# Patient Record
Sex: MALE | Race: BLACK OR AFRICAN AMERICAN | NOT HISPANIC OR LATINO | URBAN - METROPOLITAN AREA
[De-identification: names, ages, dates, MRNs, and addresses within clinical notes are randomized per-mention and may not be internally consistent; named-entity substitution may affect disease eponyms.]

---

## 2017-05-18 ENCOUNTER — FOLLOW UP ESTABLISHED (OUTPATIENT)
Dept: URBAN - METROPOLITAN AREA CLINIC 24 | Facility: CLINIC | Age: 62
End: 2017-05-18
Payer: COMMERCIAL

## 2017-05-18 DIAGNOSIS — E11.9 TYPE 2 DIABETES MELLITUS WITHOUT COMPLICATIONS: ICD-10-CM

## 2017-05-18 PROCEDURE — 92083 EXTENDED VISUAL FIELD XM: CPT | Performed by: OPTOMETRIST

## 2017-05-18 PROCEDURE — 92012 INTRM OPH EXAM EST PATIENT: CPT | Performed by: OPTOMETRIST

## 2017-05-18 ASSESSMENT — INTRAOCULAR PRESSURE
OS: 6
OD: 6

## 2017-06-19 ENCOUNTER — RX CHECK (OUTPATIENT)
Dept: URBAN - METROPOLITAN AREA CLINIC 30 | Facility: CLINIC | Age: 62
End: 2017-06-19
Payer: COMMERCIAL

## 2017-06-19 DIAGNOSIS — H02.431 PARALYTIC PTOSIS OF RIGHT EYELID: ICD-10-CM

## 2017-06-19 DIAGNOSIS — Z86.73 PRSNL HX OF TIA (TIA), AND CEREB INFRC W/O RESID DEFICITS: Primary | ICD-10-CM

## 2017-06-19 PROCEDURE — 92004 COMPRE OPH EXAM NEW PT 1/>: CPT | Performed by: OPHTHALMOLOGY

## 2017-06-19 PROCEDURE — 92134 CPTRZ OPH DX IMG PST SGM RTA: CPT | Performed by: OPHTHALMOLOGY

## 2017-06-19 ASSESSMENT — INTRAOCULAR PRESSURE
OS: 14
OD: 12

## 2017-07-18 ENCOUNTER — FOLLOW UP ESTABLISHED (OUTPATIENT)
Dept: URBAN - METROPOLITAN AREA CLINIC 24 | Facility: CLINIC | Age: 62
End: 2017-07-18
Payer: COMMERCIAL

## 2017-07-18 DIAGNOSIS — H53.451 OTHER LOCALIZED VISUAL FIELD DEFECT, RIGHT EYE: Primary | ICD-10-CM

## 2017-07-18 PROCEDURE — 92012 INTRM OPH EXAM EST PATIENT: CPT | Performed by: OPTOMETRIST

## 2017-07-18 PROCEDURE — 92082 INTERMEDIATE VISUAL FIELD XM: CPT | Performed by: OPTOMETRIST

## 2017-07-18 ASSESSMENT — INTRAOCULAR PRESSURE
OD: 8
OS: 10

## 2017-11-06 ENCOUNTER — FOLLOW UP ESTABLISHED (OUTPATIENT)
Dept: URBAN - METROPOLITAN AREA CLINIC 24 | Facility: CLINIC | Age: 62
End: 2017-11-06
Payer: COMMERCIAL

## 2017-11-06 DIAGNOSIS — H25.13 AGE-RELATED NUCLEAR CATARACT, BILATERAL: ICD-10-CM

## 2017-11-06 PROCEDURE — 92014 COMPRE OPH EXAM EST PT 1/>: CPT | Performed by: OPTOMETRIST

## 2017-11-06 PROCEDURE — 92250 FUNDUS PHOTOGRAPHY W/I&R: CPT | Performed by: OPTOMETRIST

## 2017-11-06 ASSESSMENT — VISUAL ACUITY
OS: 20/20
OD: 20/20

## 2017-11-06 ASSESSMENT — INTRAOCULAR PRESSURE
OS: 12
OD: 12

## 2017-11-06 ASSESSMENT — KERATOMETRY
OS: 45.61
OD: 46.76

## 2017-11-30 ENCOUNTER — FOLLOW UP ESTABLISHED (OUTPATIENT)
Dept: URBAN - METROPOLITAN AREA CLINIC 24 | Facility: CLINIC | Age: 62
End: 2017-11-30
Payer: COMMERCIAL

## 2017-11-30 DIAGNOSIS — H02.421 MYOGENIC PTOSIS OF RIGHT EYELID: Primary | ICD-10-CM

## 2017-11-30 DIAGNOSIS — H16.141 PUNCTATE KERATITIS OF RIGHT EYE: ICD-10-CM

## 2017-11-30 PROCEDURE — 92081 LIMITED VISUAL FIELD XM: CPT | Performed by: OPHTHALMOLOGY

## 2017-11-30 PROCEDURE — 99203 OFFICE O/P NEW LOW 30 MIN: CPT | Performed by: OPHTHALMOLOGY

## 2017-11-30 PROCEDURE — 92285 EXTERNAL OCULAR PHOTOGRAPHY: CPT | Performed by: OPHTHALMOLOGY

## 2017-11-30 RX ORDER — ERYTHROMYCIN 5 MG/G
OINTMENT OPHTHALMIC
Qty: 1 | Refills: 1 | Status: INACTIVE
Start: 2017-11-30 | End: 2021-04-06

## 2021-04-06 ENCOUNTER — OFFICE VISIT (OUTPATIENT)
Dept: URBAN - METROPOLITAN AREA CLINIC 24 | Facility: CLINIC | Age: 66
End: 2021-04-06
Payer: MEDICARE

## 2021-04-06 DIAGNOSIS — I63.9 CEREBRAL INFARCTION, UNSPECIFIED: ICD-10-CM

## 2021-04-06 DIAGNOSIS — Z79.84 LONG TERM (CURRENT) USE OF ORAL ANTIDIABETIC DRUGS: ICD-10-CM

## 2021-04-06 DIAGNOSIS — H25.813 COMBINED FORMS OF AGE-RELATED CATARACT, BILATERAL: ICD-10-CM

## 2021-04-06 PROCEDURE — 92134 CPTRZ OPH DX IMG PST SGM RTA: CPT | Performed by: OPTOMETRIST

## 2021-04-06 PROCEDURE — 99204 OFFICE O/P NEW MOD 45 MIN: CPT | Performed by: OPTOMETRIST

## 2021-04-06 ASSESSMENT — VISUAL ACUITY
OS: 20/25
OD: 20/30

## 2021-04-06 ASSESSMENT — INTRAOCULAR PRESSURE
OS: 7
OD: 9

## 2021-04-06 ASSESSMENT — KERATOMETRY: OS: 45.52

## 2021-04-06 NOTE — IMPRESSION/PLAN
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 Plan: Lost to follow-up with Dr. Barbra Theodore. Pt would like to resume care/discussion of surgical options.

## 2021-04-06 NOTE — IMPRESSION/PLAN
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 Plan: Recommend Omega 3 fatty acids (2-3,000 mg) daily, artificial tears at least 4 times a day and gel drop or tear ointment at bedtime.

## 2021-04-06 NOTE — IMPRESSION/PLAN
Impression: Combined forms of age-related cataract, bilateral: H25.813. Plan: No treatment currently recommended as patient is satisfied with current level of vision. Patient will monitor vision changes and contact us with any decrease in vision, will re-evaluate cataract on return visit.

## 2021-04-06 NOTE — IMPRESSION/PLAN
Impression: Cerebral infarction, unspecified: I63.9. Plan: Reports poor vision OD since stroke 2016. Understands BCVA likely permanently limited.

## 2021-04-29 ENCOUNTER — OFFICE VISIT (OUTPATIENT)
Dept: URBAN - METROPOLITAN AREA CLINIC 24 | Facility: CLINIC | Age: 66
End: 2021-04-29
Payer: MEDICARE

## 2021-04-29 PROCEDURE — 92285 EXTERNAL OCULAR PHOTOGRAPHY: CPT | Performed by: OPHTHALMOLOGY

## 2021-04-29 PROCEDURE — 92081 LIMITED VISUAL FIELD XM: CPT | Performed by: OPHTHALMOLOGY

## 2021-04-29 PROCEDURE — 99204 OFFICE O/P NEW MOD 45 MIN: CPT | Performed by: OPHTHALMOLOGY

## 2021-04-29 RX ORDER — ERYTHROMYCIN 5 MG/G
OINTMENT OPHTHALMIC
Qty: 1 | Refills: 2 | Status: ACTIVE
Start: 2021-04-29

## 2021-04-29 NOTE — IMPRESSION/PLAN
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 Plan: [place] screen VF & photos were performed and interpreted today and demonstrated restriction of superior and temporal visual fields. This reverted to normal demonstrating >30% increase in visual field with mechanical elevation of the eyelid and brow. The patient's eyelids did not adequately respond to phenylephrine testing, indicating that the degree of ptosis will require an external levator resection to correct the eyelid ptosis. Risks, benefits, and alternatives (including no surgery) discussed with patient.

## 2021-05-21 ENCOUNTER — ADULT PHYSICAL (OUTPATIENT)
Dept: URBAN - METROPOLITAN AREA CLINIC 24 | Facility: CLINIC | Age: 66
End: 2021-05-21
Payer: MEDICARE

## 2021-05-21 DIAGNOSIS — Z01.818 ENCOUNTER FOR OTHER PREPROCEDURAL EXAMINATION: Primary | ICD-10-CM

## 2021-05-21 PROCEDURE — 99203 OFFICE O/P NEW LOW 30 MIN: CPT | Performed by: PHYSICIAN ASSISTANT

## 2021-05-27 ENCOUNTER — SURGERY (OUTPATIENT)
Dept: URBAN - METROPOLITAN AREA SURGERY 12 | Facility: SURGERY | Age: 66
End: 2021-05-27
Payer: MEDICARE

## 2021-05-27 PROCEDURE — 67904 REPAIR EYELID DEFECT: CPT | Performed by: OPHTHALMOLOGY

## 2021-06-15 ENCOUNTER — POST-OPERATIVE VISIT (OUTPATIENT)
Dept: URBAN - METROPOLITAN AREA CLINIC 24 | Facility: CLINIC | Age: 66
End: 2021-06-15
Payer: MEDICARE

## 2021-06-15 DIAGNOSIS — Z48.89 ENCOUNTER FOR OTHER SPECIFIED SURGICAL AFTERCARE: Primary | ICD-10-CM

## 2021-06-15 PROCEDURE — 99024 POSTOP FOLLOW-UP VISIT: CPT | Performed by: OPTOMETRIST

## 2021-06-15 NOTE — IMPRESSION/PLAN
Impression: S/P External Levator Resection OD - 19 Days. Encounter for other specified surgical aftercare  Z48.89. Plan: S/P Sutures dissolved, pt doing well.